# Patient Record
Sex: FEMALE | Race: WHITE
[De-identification: names, ages, dates, MRNs, and addresses within clinical notes are randomized per-mention and may not be internally consistent; named-entity substitution may affect disease eponyms.]

---

## 2018-04-22 ENCOUNTER — HOSPITAL ENCOUNTER (INPATIENT)
Dept: HOSPITAL 84 - D.ER | Age: 83
LOS: 8 days | Discharge: NURSING, REHAB OR LONG-TERM CARE | DRG: 291 | End: 2018-04-30
Attending: FAMILY MEDICINE | Admitting: FAMILY MEDICINE
Payer: MEDICARE

## 2018-04-22 VITALS
HEIGHT: 65 IN | BODY MASS INDEX: 48.82 KG/M2 | WEIGHT: 293 LBS | WEIGHT: 293 LBS | HEIGHT: 65 IN | BODY MASS INDEX: 48.82 KG/M2 | BODY MASS INDEX: 48.82 KG/M2

## 2018-04-22 DIAGNOSIS — I51.7: ICD-10-CM

## 2018-04-22 DIAGNOSIS — N18.3: ICD-10-CM

## 2018-04-22 DIAGNOSIS — E66.9: ICD-10-CM

## 2018-04-22 DIAGNOSIS — I50.31: ICD-10-CM

## 2018-04-22 DIAGNOSIS — I50.9: ICD-10-CM

## 2018-04-22 DIAGNOSIS — I13.0: Primary | ICD-10-CM

## 2018-04-22 DIAGNOSIS — E78.5: ICD-10-CM

## 2018-04-22 DIAGNOSIS — N17.9: ICD-10-CM

## 2018-04-22 DIAGNOSIS — Z91.19: ICD-10-CM

## 2018-04-22 LAB
ALBUMIN SERPL-MCNC: 3.3 G/DL (ref 3.4–5)
ALP SERPL-CCNC: 98 U/L (ref 46–116)
ALT SERPL-CCNC: 18 U/L (ref 10–68)
ANION GAP SERPL CALC-SCNC: 12.5 MMOL/L (ref 8–16)
APPEARANCE UR: (no result)
APTT BLD: 34.2 SECONDS (ref 22.8–39.4)
BACTERIA #/AREA URNS HPF: (no result) /HPF
BASOPHILS NFR BLD AUTO: 0.4 % (ref 0–2)
BILIRUB SERPL-MCNC: 0.42 MG/DL (ref 0.2–1.3)
BILIRUB SERPL-MCNC: NEGATIVE MG/DL
BUN SERPL-MCNC: 32 MG/DL (ref 7–18)
CALCIUM SERPL-MCNC: 9 MG/DL (ref 8.5–10.1)
CHLORIDE SERPL-SCNC: 105 MMOL/L (ref 98–107)
CK SERPL-CCNC: 22 UL (ref 21–215)
CO2 SERPL-SCNC: 26.5 MMOL/L (ref 21–32)
COLOR UR: YELLOW
CREAT SERPL-MCNC: 2 MG/DL (ref 0.6–1.3)
D DIMER PPP FEU-MCNC: < 0.27 UG/MLFEU (ref 0.2–0.54)
EOSINOPHIL NFR BLD: 1.5 % (ref 0–7)
ERYTHROCYTE [DISTWIDTH] IN BLOOD BY AUTOMATED COUNT: 18.8 % (ref 11.5–14.5)
GLOBULIN SER-MCNC: 2.9 G/L
GLUCOSE SERPL-MCNC: 113 MG/DL (ref 74–106)
GLUCOSE SERPL-MCNC: NEGATIVE MG/DL
HCT VFR BLD CALC: 32.4 % (ref 36–48)
HGB BLD-MCNC: 10 G/DL (ref 12–16)
IMM GRANULOCYTES NFR BLD: 0.3 % (ref 0–5)
INR PPP: 2.7 (ref 0.85–1.17)
KETONES UR STRIP-MCNC: NEGATIVE MG/DL
LYMPHOCYTES NFR BLD AUTO: 26.1 % (ref 15–50)
MAGNESIUM SERPL-MCNC: 2.1 MG/DL (ref 1.8–2.4)
MCH RBC QN AUTO: 21.1 PG (ref 26–34)
MCHC RBC AUTO-ENTMCNC: 30.9 G/DL (ref 31–37)
MCV RBC: 68.2 FL (ref 80–100)
MONOCYTES NFR BLD: 7.4 % (ref 2–11)
NEUTROPHILS NFR BLD AUTO: 64.3 % (ref 40–80)
NITRITE UR-MCNC: NEGATIVE MG/ML
NT-PROBNP SERPL-MCNC: 2878 PG/ML (ref 0–450)
OSMOLALITY SERPL CALC.SUM OF ELEC: 285 MOSM/KG (ref 275–300)
PH UR STRIP: 5.5 [PH] (ref 5–6)
PLATELET # BLD: 235 10X3/UL (ref 130–400)
POTASSIUM SERPL-SCNC: 5 MMOL/L (ref 3.5–5.1)
PROT SERPL-MCNC: 6.2 G/DL (ref 6.4–8.2)
PROT UR-MCNC: NEGATIVE MG/DL
PROTHROMBIN TIME: 27.9 SECONDS (ref 11.6–15)
RBC # BLD AUTO: 4.75 10X6/UL (ref 4–5.4)
RBC #/AREA URNS HPF: (no result) /HPF (ref 0–5)
SODIUM SERPL-SCNC: 139 MMOL/L (ref 136–145)
SP GR UR STRIP: 1.01 (ref 1–1.02)
SQUAMOUS #/AREA URNS HPF: (no result) /HPF (ref 0–5)
TROPONIN I SERPL-MCNC: < 0.017 NG/ML (ref 0–0.06)
TSH SERPL-ACNC: 1.24 UIU/ML (ref 0.36–3.74)
UROBILINOGEN UR-MCNC: NORMAL MG/DL
WBC # BLD AUTO: 7.9 10X3/UL (ref 4.8–10.8)
WBC #/AREA URNS HPF: (no result) /HPF (ref 0–5)

## 2018-04-23 VITALS — DIASTOLIC BLOOD PRESSURE: 65 MMHG | SYSTOLIC BLOOD PRESSURE: 95 MMHG

## 2018-04-23 VITALS — SYSTOLIC BLOOD PRESSURE: 129 MMHG | DIASTOLIC BLOOD PRESSURE: 72 MMHG

## 2018-04-23 VITALS — DIASTOLIC BLOOD PRESSURE: 64 MMHG | SYSTOLIC BLOOD PRESSURE: 145 MMHG

## 2018-04-23 VITALS — DIASTOLIC BLOOD PRESSURE: 86 MMHG | SYSTOLIC BLOOD PRESSURE: 155 MMHG

## 2018-04-23 VITALS — SYSTOLIC BLOOD PRESSURE: 141 MMHG | DIASTOLIC BLOOD PRESSURE: 64 MMHG

## 2018-04-23 LAB
ANION GAP SERPL CALC-SCNC: 11.6 MMOL/L (ref 8–16)
BASOPHILS NFR BLD AUTO: 0.5 % (ref 0–2)
BUN SERPL-MCNC: 31 MG/DL (ref 7–18)
CALCIUM SERPL-MCNC: 8.5 MG/DL (ref 8.5–10.1)
CHLORIDE SERPL-SCNC: 104 MMOL/L (ref 98–107)
CO2 SERPL-SCNC: 27.2 MMOL/L (ref 21–32)
CREAT SERPL-MCNC: 2.1 MG/DL (ref 0.6–1.3)
EOSINOPHIL NFR BLD: 1.6 % (ref 0–7)
ERYTHROCYTE [DISTWIDTH] IN BLOOD BY AUTOMATED COUNT: 18.5 % (ref 11.5–14.5)
GLUCOSE SERPL-MCNC: 123 MG/DL (ref 74–106)
HCT VFR BLD CALC: 30.8 % (ref 36–48)
HGB BLD-MCNC: 9.4 G/DL (ref 12–16)
IMM GRANULOCYTES NFR BLD: 0.3 % (ref 0–5)
LYMPHOCYTES NFR BLD AUTO: 25.5 % (ref 15–50)
MCH RBC QN AUTO: 20.7 PG (ref 26–34)
MCHC RBC AUTO-ENTMCNC: 30.5 G/DL (ref 31–37)
MCV RBC: 67.8 FL (ref 80–100)
MONOCYTES NFR BLD: 10.2 % (ref 2–11)
NEUTROPHILS NFR BLD AUTO: 61.9 % (ref 40–80)
OSMOLALITY SERPL CALC.SUM OF ELEC: 283 MOSM/KG (ref 275–300)
PLATELET # BLD: 229 10X3/UL (ref 130–400)
POTASSIUM SERPL-SCNC: 4.8 MMOL/L (ref 3.5–5.1)
RBC # BLD AUTO: 4.54 10X6/UL (ref 4–5.4)
SODIUM SERPL-SCNC: 138 MMOL/L (ref 136–145)
WBC # BLD AUTO: 7.6 10X3/UL (ref 4.8–10.8)

## 2018-04-24 VITALS — SYSTOLIC BLOOD PRESSURE: 109 MMHG | DIASTOLIC BLOOD PRESSURE: 59 MMHG

## 2018-04-24 VITALS — SYSTOLIC BLOOD PRESSURE: 150 MMHG | DIASTOLIC BLOOD PRESSURE: 95 MMHG

## 2018-04-24 VITALS — SYSTOLIC BLOOD PRESSURE: 116 MMHG | DIASTOLIC BLOOD PRESSURE: 55 MMHG

## 2018-04-24 VITALS — SYSTOLIC BLOOD PRESSURE: 103 MMHG | DIASTOLIC BLOOD PRESSURE: 59 MMHG

## 2018-04-24 VITALS — DIASTOLIC BLOOD PRESSURE: 55 MMHG | SYSTOLIC BLOOD PRESSURE: 105 MMHG

## 2018-04-24 VITALS — DIASTOLIC BLOOD PRESSURE: 57 MMHG | SYSTOLIC BLOOD PRESSURE: 103 MMHG

## 2018-04-24 LAB
ALBUMIN SERPL-MCNC: 3 G/DL (ref 3.4–5)
ALP SERPL-CCNC: 92 U/L (ref 46–116)
ALT SERPL-CCNC: 16 U/L (ref 10–68)
ANION GAP SERPL CALC-SCNC: 11.1 MMOL/L (ref 8–16)
BASOPHILS NFR BLD AUTO: 0.7 % (ref 0–2)
BILIRUB SERPL-MCNC: 0.4 MG/DL (ref 0.2–1.3)
BUN SERPL-MCNC: 38 MG/DL (ref 7–18)
CALCIUM SERPL-MCNC: 9.1 MG/DL (ref 8.5–10.1)
CHLORIDE SERPL-SCNC: 104 MMOL/L (ref 98–107)
CO2 SERPL-SCNC: 29.5 MMOL/L (ref 21–32)
CREAT SERPL-MCNC: 2.5 MG/DL (ref 0.6–1.3)
CREATININE - URINE: 41.7 MG/DL (ref 30–125)
EOSINOPHIL NFR BLD: 2.4 % (ref 0–7)
ERYTHROCYTE [DISTWIDTH] IN BLOOD BY AUTOMATED COUNT: 18.5 % (ref 11.5–14.5)
ERYTHROCYTE [SEDIMENTATION RATE] IN BLOOD: 19 MM/HR (ref 0–42)
GLOBULIN SER-MCNC: 3.2 G/L
GLUCOSE SERPL-MCNC: 118 MG/DL (ref 74–106)
HCT VFR BLD CALC: 32.2 % (ref 36–48)
HGB BLD-MCNC: 9.6 G/DL (ref 12–16)
IMM GRANULOCYTES NFR BLD: 0.3 % (ref 0–5)
LYMPHOCYTES NFR BLD AUTO: 21.2 % (ref 15–50)
MCH RBC QN AUTO: 20.3 PG (ref 26–34)
MCHC RBC AUTO-ENTMCNC: 29.8 G/DL (ref 31–37)
MCV RBC: 68.2 FL (ref 80–100)
MONOCYTES NFR BLD: 8.8 % (ref 2–11)
NEUTROPHILS NFR BLD AUTO: 66.6 % (ref 40–80)
OSMOLALITY SERPL CALC.SUM OF ELEC: 288 MOSM/KG (ref 275–300)
PLATELET # BLD: 259 10X3/UL (ref 130–400)
POTASSIUM SERPL-SCNC: 4.6 MMOL/L (ref 3.5–5.1)
PROT SERPL-MCNC: 6.2 G/DL (ref 6.4–8.2)
PROT UR-MCNC: 4.7 MG/DL (ref 0–11.9)
RBC # BLD AUTO: 4.72 10X6/UL (ref 4–5.4)
SODIUM SERPL-SCNC: 140 MMOL/L (ref 136–145)
WBC # BLD AUTO: 7.2 10X3/UL (ref 4.8–10.8)

## 2018-04-25 VITALS — DIASTOLIC BLOOD PRESSURE: 71 MMHG | SYSTOLIC BLOOD PRESSURE: 121 MMHG

## 2018-04-25 VITALS — SYSTOLIC BLOOD PRESSURE: 117 MMHG | DIASTOLIC BLOOD PRESSURE: 71 MMHG

## 2018-04-25 VITALS — SYSTOLIC BLOOD PRESSURE: 113 MMHG | DIASTOLIC BLOOD PRESSURE: 67 MMHG

## 2018-04-25 VITALS — SYSTOLIC BLOOD PRESSURE: 123 MMHG | DIASTOLIC BLOOD PRESSURE: 77 MMHG

## 2018-04-25 VITALS — SYSTOLIC BLOOD PRESSURE: 121 MMHG | DIASTOLIC BLOOD PRESSURE: 53 MMHG

## 2018-04-25 LAB
ALBUMIN SERPL ELPH-MCNC: 3.1 G/DL (ref 2.9–4.4)
ALBUMIN SERPL-MCNC: 2.9 G/DL (ref 3.4–5)
ALBUMIN/GLOB SERPL: 1.2 {RATIO} (ref 0.7–1.7)
ALP SERPL-CCNC: 101 U/L (ref 46–116)
ALPHA1 GLOB SERPL ELPH-MCNC: 0.2 G/DL (ref 0–0.4)
ALPHA2 GLOB SERPL ELPH-MCNC: 0.8 G/DL (ref 0.4–1)
ALT SERPL-CCNC: 14 U/L (ref 10–68)
ANION GAP SERPL CALC-SCNC: 11.7 MMOL/L (ref 8–16)
B-GLOBULIN SERPL ELPH-MCNC: 0.9 G/DL (ref 0.7–1.3)
BASOPHILS NFR BLD AUTO: 0.6 % (ref 0–2)
BILIRUB SERPL-MCNC: 0.35 MG/DL (ref 0.2–1.3)
BUN SERPL-MCNC: 46 MG/DL (ref 7–18)
CALCIUM SERPL-MCNC: 9 MG/DL (ref 8.5–10.1)
CHLORIDE SERPL-SCNC: 103 MMOL/L (ref 98–107)
CO2 SERPL-SCNC: 30.4 MMOL/L (ref 21–32)
CREAT SERPL-MCNC: 2.2 MG/DL (ref 0.6–1.3)
EOSINOPHIL NFR BLD: 2.8 % (ref 0–7)
ERYTHROCYTE [DISTWIDTH] IN BLOOD BY AUTOMATED COUNT: 18.3 % (ref 11.5–14.5)
GAMMA GLOB SERPL ELPH-MCNC: 0.6 G/DL (ref 0.4–1.8)
GLOBULIN SER-MCNC: 3.3 G/L
GLUCOSE SERPL-MCNC: 136 MG/DL (ref 74–106)
HCT VFR BLD CALC: 32.8 % (ref 36–48)
HGB BLD-MCNC: 10 G/DL (ref 12–16)
IMM GRANULOCYTES NFR BLD: 0.3 % (ref 0–5)
INR PPP: 2.55 (ref 0.85–1.17)
LYMPHOCYTES NFR BLD AUTO: 20.1 % (ref 15–50)
MCH RBC QN AUTO: 21 PG (ref 26–34)
MCHC RBC AUTO-ENTMCNC: 30.5 G/DL (ref 31–37)
MCV RBC: 68.9 FL (ref 80–100)
MONOCYTES NFR BLD: 11.4 % (ref 2–11)
NEUTROPHILS NFR BLD AUTO: 64.8 % (ref 40–80)
OSMOLALITY SERPL CALC.SUM OF ELEC: 292 MOSM/KG (ref 275–300)
PLATELET # BLD: 213 10X3/UL (ref 130–400)
POTASSIUM SERPL-SCNC: 5.1 MMOL/L (ref 3.5–5.1)
PROT SERPL-MCNC: 5.6 G/DL (ref 6–8.5)
PROT SERPL-MCNC: 6.2 G/DL (ref 6.4–8.2)
PROTHROMBIN TIME: 26.7 SECONDS (ref 11.6–15)
RBC # BLD AUTO: 4.76 10X6/UL (ref 4–5.4)
SODIUM SERPL-SCNC: 140 MMOL/L (ref 136–145)
WBC # BLD AUTO: 7.1 10X3/UL (ref 4.8–10.8)

## 2018-04-26 VITALS — SYSTOLIC BLOOD PRESSURE: 109 MMHG | DIASTOLIC BLOOD PRESSURE: 60 MMHG

## 2018-04-26 VITALS — SYSTOLIC BLOOD PRESSURE: 114 MMHG | DIASTOLIC BLOOD PRESSURE: 60 MMHG

## 2018-04-26 VITALS — SYSTOLIC BLOOD PRESSURE: 144 MMHG | DIASTOLIC BLOOD PRESSURE: 74 MMHG

## 2018-04-26 VITALS — DIASTOLIC BLOOD PRESSURE: 47 MMHG | SYSTOLIC BLOOD PRESSURE: 126 MMHG

## 2018-04-26 VITALS — SYSTOLIC BLOOD PRESSURE: 119 MMHG | DIASTOLIC BLOOD PRESSURE: 56 MMHG

## 2018-04-26 VITALS — DIASTOLIC BLOOD PRESSURE: 64 MMHG | SYSTOLIC BLOOD PRESSURE: 133 MMHG

## 2018-04-26 LAB
INR PPP: 1.87 (ref 0.85–1.17)
PROTHROMBIN TIME: 21 SECONDS (ref 11.6–15)

## 2018-04-27 VITALS — SYSTOLIC BLOOD PRESSURE: 121 MMHG | DIASTOLIC BLOOD PRESSURE: 72 MMHG

## 2018-04-27 VITALS — DIASTOLIC BLOOD PRESSURE: 70 MMHG | SYSTOLIC BLOOD PRESSURE: 127 MMHG

## 2018-04-27 VITALS — DIASTOLIC BLOOD PRESSURE: 61 MMHG | SYSTOLIC BLOOD PRESSURE: 114 MMHG

## 2018-04-27 VITALS — SYSTOLIC BLOOD PRESSURE: 141 MMHG | DIASTOLIC BLOOD PRESSURE: 78 MMHG

## 2018-04-27 VITALS — SYSTOLIC BLOOD PRESSURE: 123 MMHG | DIASTOLIC BLOOD PRESSURE: 72 MMHG

## 2018-04-27 LAB
INR PPP: 1.95 (ref 0.85–1.17)
PROTHROMBIN TIME: 21.6 SECONDS (ref 11.6–15)

## 2018-04-28 VITALS — DIASTOLIC BLOOD PRESSURE: 71 MMHG | SYSTOLIC BLOOD PRESSURE: 122 MMHG

## 2018-04-28 VITALS — DIASTOLIC BLOOD PRESSURE: 77 MMHG | SYSTOLIC BLOOD PRESSURE: 111 MMHG

## 2018-04-28 VITALS — DIASTOLIC BLOOD PRESSURE: 71 MMHG | SYSTOLIC BLOOD PRESSURE: 133 MMHG

## 2018-04-28 VITALS — SYSTOLIC BLOOD PRESSURE: 125 MMHG | DIASTOLIC BLOOD PRESSURE: 65 MMHG

## 2018-04-28 VITALS — DIASTOLIC BLOOD PRESSURE: 71 MMHG | SYSTOLIC BLOOD PRESSURE: 129 MMHG

## 2018-04-28 VITALS — DIASTOLIC BLOOD PRESSURE: 79 MMHG | SYSTOLIC BLOOD PRESSURE: 124 MMHG

## 2018-04-28 LAB
ALBUMIN SERPL-MCNC: 2.7 G/DL (ref 3.4–5)
ALP SERPL-CCNC: 100 U/L (ref 46–116)
ALT SERPL-CCNC: 17 U/L (ref 10–68)
ANION GAP SERPL CALC-SCNC: 10 MMOL/L (ref 8–16)
BASOPHILS NFR BLD AUTO: 0.4 % (ref 0–2)
BILIRUB SERPL-MCNC: 0.4 MG/DL (ref 0.2–1.3)
BUN SERPL-MCNC: 50 MG/DL (ref 7–18)
CALCIUM SERPL-MCNC: 9.7 MG/DL (ref 8.5–10.1)
CHLORIDE SERPL-SCNC: 99 MMOL/L (ref 98–107)
CO2 SERPL-SCNC: 31.6 MMOL/L (ref 21–32)
CREAT SERPL-MCNC: 2 MG/DL (ref 0.6–1.3)
EOSINOPHIL NFR BLD: 3.6 % (ref 0–7)
ERYTHROCYTE [DISTWIDTH] IN BLOOD BY AUTOMATED COUNT: 17.5 % (ref 11.5–14.5)
GLOBULIN SER-MCNC: 3.4 G/L
GLUCOSE SERPL-MCNC: 116 MG/DL (ref 74–106)
HCT VFR BLD CALC: 30.5 % (ref 36–48)
HGB BLD-MCNC: 9.3 G/DL (ref 12–16)
IMM GRANULOCYTES NFR BLD: 0.3 % (ref 0–5)
LYMPHOCYTES NFR BLD AUTO: 24.1 % (ref 15–50)
MCH RBC QN AUTO: 20.5 PG (ref 26–34)
MCHC RBC AUTO-ENTMCNC: 30.5 G/DL (ref 31–37)
MCV RBC: 67.3 FL (ref 80–100)
MONOCYTES NFR BLD: 10 % (ref 2–11)
NEUTROPHILS NFR BLD AUTO: 61.6 % (ref 40–80)
OSMOLALITY SERPL CALC.SUM OF ELEC: 285 MOSM/KG (ref 275–300)
PLATELET # BLD: 233 10X3/UL (ref 130–400)
POTASSIUM SERPL-SCNC: 4.6 MMOL/L (ref 3.5–5.1)
PROT SERPL-MCNC: 6.1 G/DL (ref 6.4–8.2)
RBC # BLD AUTO: 4.53 10X6/UL (ref 4–5.4)
SODIUM SERPL-SCNC: 136 MMOL/L (ref 136–145)
WBC # BLD AUTO: 6.7 10X3/UL (ref 4.8–10.8)

## 2018-04-29 VITALS — DIASTOLIC BLOOD PRESSURE: 71 MMHG | SYSTOLIC BLOOD PRESSURE: 106 MMHG

## 2018-04-29 VITALS — DIASTOLIC BLOOD PRESSURE: 53 MMHG | SYSTOLIC BLOOD PRESSURE: 107 MMHG

## 2018-04-29 VITALS — SYSTOLIC BLOOD PRESSURE: 121 MMHG | DIASTOLIC BLOOD PRESSURE: 71 MMHG

## 2018-04-29 VITALS — DIASTOLIC BLOOD PRESSURE: 82 MMHG | SYSTOLIC BLOOD PRESSURE: 132 MMHG

## 2018-04-29 VITALS — SYSTOLIC BLOOD PRESSURE: 119 MMHG | DIASTOLIC BLOOD PRESSURE: 75 MMHG

## 2018-04-29 VITALS — SYSTOLIC BLOOD PRESSURE: 134 MMHG | DIASTOLIC BLOOD PRESSURE: 74 MMHG

## 2018-04-30 VITALS — DIASTOLIC BLOOD PRESSURE: 71 MMHG | SYSTOLIC BLOOD PRESSURE: 126 MMHG

## 2018-04-30 VITALS — DIASTOLIC BLOOD PRESSURE: 64 MMHG | SYSTOLIC BLOOD PRESSURE: 119 MMHG

## 2019-01-31 ENCOUNTER — HOSPITAL ENCOUNTER (OUTPATIENT)
Dept: HOSPITAL 84 - D.LABREF | Age: 84
Discharge: HOME | End: 2019-01-31
Attending: FAMILY MEDICINE
Payer: MEDICARE

## 2019-01-31 VITALS — BODY MASS INDEX: 49.2 KG/M2

## 2019-01-31 DIAGNOSIS — Z79.01: ICD-10-CM

## 2019-01-31 DIAGNOSIS — I50.22: ICD-10-CM

## 2019-01-31 DIAGNOSIS — Z51.81: Primary | ICD-10-CM

## 2019-01-31 DIAGNOSIS — I25.10: ICD-10-CM

## 2019-01-31 LAB
INR PPP: 1.83 (ref 0.85–1.17)
PROTHROMBIN TIME: 20.5 SECONDS (ref 11.6–15)

## 2019-02-06 ENCOUNTER — HOSPITAL ENCOUNTER (OUTPATIENT)
Dept: HOSPITAL 84 - D.LABREF | Age: 84
Discharge: HOME | End: 2019-02-06
Attending: FAMILY MEDICINE
Payer: MEDICARE

## 2019-02-06 VITALS — BODY MASS INDEX: 49.2 KG/M2

## 2019-02-06 DIAGNOSIS — I25.10: ICD-10-CM

## 2019-02-06 DIAGNOSIS — I50.9: ICD-10-CM

## 2019-02-06 DIAGNOSIS — Z51.81: Primary | ICD-10-CM

## 2019-02-06 DIAGNOSIS — Z79.01: ICD-10-CM

## 2019-02-06 LAB
INR PPP: 1.92 (ref 0.85–1.17)
PROTHROMBIN TIME: 21.3 SECONDS (ref 11.6–15)

## 2019-08-12 ENCOUNTER — HOSPITAL ENCOUNTER (INPATIENT)
Dept: HOSPITAL 84 - D.ER | Age: 84
LOS: 4 days | Discharge: NURSING, REHAB OR LONG-TERM CARE | DRG: 640 | End: 2019-08-16
Attending: INTERNAL MEDICINE | Admitting: INTERNAL MEDICINE
Payer: MEDICARE

## 2019-08-12 VITALS
HEIGHT: 65 IN | BODY MASS INDEX: 48.82 KG/M2 | WEIGHT: 293 LBS | BODY MASS INDEX: 48.82 KG/M2 | HEIGHT: 65 IN | BODY MASS INDEX: 48.82 KG/M2 | WEIGHT: 293 LBS

## 2019-08-12 VITALS — DIASTOLIC BLOOD PRESSURE: 65 MMHG | SYSTOLIC BLOOD PRESSURE: 147 MMHG

## 2019-08-12 VITALS — DIASTOLIC BLOOD PRESSURE: 73 MMHG | SYSTOLIC BLOOD PRESSURE: 141 MMHG

## 2019-08-12 VITALS — DIASTOLIC BLOOD PRESSURE: 87 MMHG | SYSTOLIC BLOOD PRESSURE: 145 MMHG

## 2019-08-12 DIAGNOSIS — E87.1: Primary | ICD-10-CM

## 2019-08-12 DIAGNOSIS — N39.0: ICD-10-CM

## 2019-08-12 DIAGNOSIS — I50.33: ICD-10-CM

## 2019-08-12 DIAGNOSIS — E03.9: ICD-10-CM

## 2019-08-12 DIAGNOSIS — D50.9: ICD-10-CM

## 2019-08-12 DIAGNOSIS — I11.0: ICD-10-CM

## 2019-08-12 DIAGNOSIS — B96.20: ICD-10-CM

## 2019-08-12 DIAGNOSIS — F32.9: ICD-10-CM

## 2019-08-12 DIAGNOSIS — E83.42: ICD-10-CM

## 2019-08-12 DIAGNOSIS — I08.1: ICD-10-CM

## 2019-08-12 DIAGNOSIS — F41.9: ICD-10-CM

## 2019-08-12 DIAGNOSIS — J44.9: ICD-10-CM

## 2019-08-12 DIAGNOSIS — J96.11: ICD-10-CM

## 2019-08-12 DIAGNOSIS — I48.91: ICD-10-CM

## 2019-08-12 DIAGNOSIS — N17.9: ICD-10-CM

## 2019-08-12 DIAGNOSIS — E66.01: ICD-10-CM

## 2019-08-12 DIAGNOSIS — K21.9: ICD-10-CM

## 2019-08-12 DIAGNOSIS — E78.5: ICD-10-CM

## 2019-08-12 LAB
ALBUMIN SERPL-MCNC: 3.1 G/DL (ref 3.4–5)
ALP SERPL-CCNC: 120 U/L (ref 46–116)
ALT SERPL-CCNC: 13 U/L (ref 10–68)
AMPHETAMINES UR QL SCN: NEGATIVE QUAL
ANION GAP SERPL CALC-SCNC: 7.2 MMOL/L (ref 8–16)
APPEARANCE UR: (no result)
APTT BLD: 39.2 SECONDS (ref 22.8–39.4)
BACTERIA #/AREA URNS HPF: (no result) /HPF
BARBITURATES UR QL SCN: NEGATIVE QUAL
BASOPHILS NFR BLD AUTO: 0.3 % (ref 0–2)
BENZODIAZ UR QL SCN: NEGATIVE QUAL
BILIRUB SERPL-MCNC: 0.73 MG/DL (ref 0.2–1.3)
BILIRUB SERPL-MCNC: NEGATIVE MG/DL
BUN SERPL-MCNC: 16 MG/DL (ref 7–18)
BZE UR QL SCN: NEGATIVE QUAL
CALCIUM SERPL-MCNC: 9.2 MG/DL (ref 8.5–10.1)
CANNABINOIDS UR QL SCN: NEGATIVE QUAL
CHLORIDE SERPL-SCNC: 98 MMOL/L (ref 98–107)
CK MB SERPL-MCNC: 0.5 U/L (ref 0–3.6)
CK SERPL-CCNC: 25 UL (ref 21–215)
CO2 SERPL-SCNC: 28.5 MMOL/L (ref 21–32)
COLOR UR: YELLOW
CREAT SERPL-MCNC: 1.5 MG/DL (ref 0.6–1.3)
EOSINOPHIL NFR BLD: 0.4 % (ref 0–7)
ERYTHROCYTE [DISTWIDTH] IN BLOOD BY AUTOMATED COUNT: 19.1 % (ref 11.5–14.5)
GLOBULIN SER-MCNC: 2.7 G/L
GLUCOSE SERPL-MCNC: 106 MG/DL (ref 74–106)
GLUCOSE SERPL-MCNC: NEGATIVE MG/DL
HCT VFR BLD CALC: 34.1 % (ref 36–48)
HGB BLD-MCNC: 11.1 G/DL (ref 12–16)
IMM GRANULOCYTES NFR BLD: 0.4 % (ref 0–5)
INR PPP: 2.83 (ref 0.85–1.17)
KETONES UR STRIP-MCNC: NEGATIVE MG/DL
LYMPHOCYTES NFR BLD AUTO: 8.4 % (ref 15–50)
MAGNESIUM SERPL-MCNC: 1.8 MG/DL (ref 1.8–2.4)
MCH RBC QN AUTO: 21.3 PG (ref 26–34)
MCHC RBC AUTO-ENTMCNC: 32.6 G/DL (ref 31–37)
MCV RBC: 65.6 FL (ref 80–100)
MONOCYTES NFR BLD: 7.2 % (ref 2–11)
NEUTROPHILS NFR BLD AUTO: 83.3 % (ref 40–80)
NITRITE UR-MCNC: NEGATIVE MG/ML
OPIATES UR QL SCN: NEGATIVE QUAL
OSMOLALITY SERPL CALC.SUM OF ELEC: 261 MOSM/KG (ref 275–300)
PCP UR QL SCN: NEGATIVE QUAL
PH UR STRIP: 6 [PH] (ref 5–6)
PLATELET # BLD: 206 10X3/UL (ref 130–400)
POTASSIUM SERPL-SCNC: 3.7 MMOL/L (ref 3.5–5.1)
PROT SERPL-MCNC: 5.8 G/DL (ref 6.4–8.2)
PROT UR-MCNC: NEGATIVE MG/DL
PROTHROMBIN TIME: 29 SECONDS (ref 11.6–15)
RBC # BLD AUTO: 5.2 10X6/UL (ref 4–5.4)
RBC #/AREA URNS HPF: (no result) /HPF (ref 0–5)
SODIUM SERPL-SCNC: 130 MMOL/L (ref 136–145)
SP GR UR STRIP: 1.01 (ref 1–1.02)
SQUAMOUS #/AREA URNS HPF: (no result) /HPF (ref 0–5)
TROPONIN I SERPL-MCNC: < 0.017 NG/ML (ref 0–0.06)
TSH SERPL-ACNC: 1.06 UIU/ML (ref 0.36–3.74)
UROBILINOGEN UR-MCNC: NORMAL MG/DL
WBC # BLD AUTO: 9.4 10X3/UL (ref 4.8–10.8)
WBC #/AREA URNS HPF: (no result) /HPF (ref 0–5)

## 2019-08-12 NOTE — NUR
PT STATES SHE IS URINATING EACH TIME SHE IS COUGHING AND PT HAS DRY WEAK COUGH
FREQUENTLY. TOTAL LINEN CHANGE PERFORMED BY ED NURSES, PT'S MOBILITY IS VERY
LIMITED. URINE SATURATED BRIEF DISCHARDED.

## 2019-08-12 NOTE — NUR
PT LYING IN BED, RESPIRATIONS EVEN AND UNLABORED. MULTIPLE BLANKETS AND ICE
WATER PROVIDED FOR COMFORT AS REQUESTED. FAMILY AT THE BEDSIDE. CALL LIGHT IN
REACH. WILL CONTINUE TO MONITOR.

## 2019-08-12 NOTE — NUR
PT ARRIVED TO FLOOR FROM ER. VSS AND WNL. PT 02 % ON 3L. PT USUALLY
WEARS 2L AT HOME. DECREASED O2 TO 2L. FULL LINEN CHANGE PROVIDED DUE TO URINE
INCONTINENCE

## 2019-08-13 VITALS — DIASTOLIC BLOOD PRESSURE: 56 MMHG | SYSTOLIC BLOOD PRESSURE: 146 MMHG

## 2019-08-13 VITALS — DIASTOLIC BLOOD PRESSURE: 75 MMHG | SYSTOLIC BLOOD PRESSURE: 145 MMHG

## 2019-08-13 VITALS — DIASTOLIC BLOOD PRESSURE: 65 MMHG | SYSTOLIC BLOOD PRESSURE: 147 MMHG

## 2019-08-13 VITALS — SYSTOLIC BLOOD PRESSURE: 156 MMHG | DIASTOLIC BLOOD PRESSURE: 60 MMHG

## 2019-08-13 VITALS — SYSTOLIC BLOOD PRESSURE: 152 MMHG | DIASTOLIC BLOOD PRESSURE: 70 MMHG

## 2019-08-13 VITALS — DIASTOLIC BLOOD PRESSURE: 84 MMHG | SYSTOLIC BLOOD PRESSURE: 125 MMHG

## 2019-08-13 LAB
ANION GAP SERPL CALC-SCNC: 10.3 MMOL/L (ref 8–16)
APTT BLD: 53.2 SECONDS (ref 22.8–39.4)
BASOPHILS NFR BLD AUTO: 0.4 % (ref 0–2)
BUN SERPL-MCNC: 15 MG/DL (ref 7–18)
CALCIUM SERPL-MCNC: 9 MG/DL (ref 8.5–10.1)
CHLORIDE SERPL-SCNC: 103 MMOL/L (ref 98–107)
CK MB SERPL-MCNC: 0.6 U/L (ref 0–3.6)
CK MB SERPL-MCNC: 0.6 U/L (ref 0–3.6)
CK SERPL-CCNC: 21 UL (ref 21–215)
CK SERPL-CCNC: 22 UL (ref 21–215)
CO2 SERPL-SCNC: 31.3 MMOL/L (ref 21–32)
CREAT SERPL-MCNC: 1.6 MG/DL (ref 0.6–1.3)
EOSINOPHIL NFR BLD: 1.3 % (ref 0–7)
ERYTHROCYTE [DISTWIDTH] IN BLOOD BY AUTOMATED COUNT: 19.2 % (ref 11.5–14.5)
FERRITIN SERPL-MCNC: 98 NG/ML (ref 3–244)
GLUCOSE SERPL-MCNC: 82 MG/DL (ref 74–106)
HCT VFR BLD CALC: 32.5 % (ref 36–48)
HGB BLD-MCNC: 10.4 G/DL (ref 12–16)
IMM GRANULOCYTES NFR BLD: 0.3 % (ref 0–5)
INR PPP: 2.78 (ref 0.85–1.17)
IRON SERPL-MCNC: 114 UG/DL (ref 35–150)
LYMPHOCYTES NFR BLD AUTO: 14.6 % (ref 15–50)
MAGNESIUM SERPL-MCNC: 1.6 MG/DL (ref 1.8–2.4)
MCH RBC QN AUTO: 21.3 PG (ref 26–34)
MCHC RBC AUTO-ENTMCNC: 32 G/DL (ref 31–37)
MCV RBC: 66.5 FL (ref 80–100)
MONOCYTES NFR BLD: 8.3 % (ref 2–11)
NEUTROPHILS NFR BLD AUTO: 75.1 % (ref 40–80)
NT-PROBNP SERPL-MCNC: 3669 PG/ML (ref 0–450)
OSMOLALITY SERPL CALC.SUM OF ELEC: 280 MOSM/KG (ref 275–300)
PHOSPHATE SERPL-MCNC: 3 MG/DL (ref 2.5–4.9)
PLATELET # BLD: 193 10X3/UL (ref 130–400)
POTASSIUM SERPL-SCNC: 3.6 MMOL/L (ref 3.5–5.1)
PROTHROMBIN TIME: 28.6 SECONDS (ref 11.6–15)
RBC # BLD AUTO: 4.89 10X6/UL (ref 4–5.4)
SAO2 % BLD FROM PO2: 44 % (ref 15–55)
SODIUM SERPL-SCNC: 141 MMOL/L (ref 136–145)
TIBC SERPL-MCNC: 257 UG/DL (ref 260–445)
TROPONIN I SERPL-MCNC: < 0.017 NG/ML (ref 0–0.06)
TROPONIN I SERPL-MCNC: < 0.017 NG/ML (ref 0–0.06)
UIBC SERPL-MCNC: 143 UG/DL (ref 150–375)
WBC # BLD AUTO: 7.5 10X3/UL (ref 4.8–10.8)

## 2019-08-13 NOTE — NUR
ASSISTED PT ONTO AND OFF OF BED AMBROCIO. CHANGED LINENS. CALLED [facility] TO OBTAIN
ORDER FOR NYSTATIN POWDER. WILL CTM.

## 2019-08-13 NOTE — NUR
HUNG IV MEDICATION, NO ISSUES. PT RESTING WITH EYES CLOSED, BREATHING EVEN AND
UNLABORED. NO S/S OF DISTRESS. WILL CTM.

## 2019-08-13 NOTE — MORECARE
CASE MANAGEMENT DISCHARGE SUMMARY
 
 
PATIENT: HYUN RODAS                      UNIT: [id number]
ACCOUNT#: [id number]                       ADM DATE: 19
AGE: 86     : 32  SEX: F            ROOM/BED: D.1204    
AUTHOR: JAKE MCKEON                             PHYSICIAN:                               
 
REFERRING PHYSICIAN: RK NICHOLSON MD               
DATE OF SERVICE: 19
Discharge Plan
 
 
Patient Name: HYUN RODAS
Facility: [facility]:[place]
Encounter #: [id number]
Medical Record #: [id number]
: 1932
Planned Disposition: Home
Anticipated Discharge Date: 
 
Discharge Date: 
Expected LOS: 
Initial Reviewer: [email or web address]
Initial Review Date: 2019
Generated: 19   8:07 pm 
 DCPIA - Discharge Planning Initial Assessment
 
Updated by [email or web address]: Elvira Lacy on 19   7:05 pm
*  Is the patient Alert and Oriented?
Yes
*  How many steps to enter\exit or inside your home?
 
*  PCP
[facility]
*  Pharmacy
[facility]
*  Preadmission Environment
Home Alone
*  ADLs
Partial Dependent
*  Other Equipment
walker, w/c and home 02
*  List name and contact numbers for known caregivers / representatives who 
currently or will assist patient after discharge:
Gabby Jimenes - daughter - [phone], [phone]
*  Verbal permission to speak to the caregivers and representatives has been 
obtained from the patient.
Yes
*  Community resources currently utilized
 
Other
*  Please name any agencies selected above.
[facility] - unknown provider
*  Additional services required to return to the preadmission environment?
No
*  Can the patient safely return to the preadmission environment?
Yes
*  Has this patient been hospitalized within the prior 30 days at any 
hospital?
No
 
 
 
 
 
 
Patient Name: HYUN RODAS
Encounter #: [id number]
Page [id number]
 
 
 
 
 
Electronically Signed by JAKE MCKEON on 19 at 1907
 
 
 
 
 
 
**All edits/amendments must be made on the electronic document**
 
DICTATION DATE: 19     : BART  19     
RPT#: [id number]                                DC DATE:        
                                               STATUS: ADM IN  
[facility]
 [place] [address]
***END OF REPORT***

## 2019-08-13 NOTE — MORECARE
CASE MANAGEMENT DISCHARGE SUMMARY
 
 
PATIENT: HYUN RODAS                      UNIT: [id number]
ACCOUNT#: [id number]                       ADM DATE: 19
AGE: 86     : 32  SEX: F            ROOM/BED: D.1204    
AUTHOR: LINDA,DOC                             PHYSICIAN:                               
 
REFERRING PHYSICIAN: RK NICHOLSON MD               
DATE OF SERVICE: 19
Discharge Plan
 
 
Patient Name: HYUN RODAS
Facility: [facility]:[place]
Encounter #: [id number]
Medical Record #: [id number]
: 1932
Planned Disposition: Home
Anticipated Discharge Date: 
 
Discharge Date: 
Expected LOS: 
Initial Reviewer: [id number]
Initial Review Date: 2019
Generated: 19   8:14 pm 
Comments
 
DCP- Discharge Planning
 
Updated by [id number]: Elvira Lacy on 19   6:09 pm CT
Patient Name: HYUN RODAS                                     
Admission Status: ER   
Accout number: [id number]                              
Admission Date: 2019   
: 1932                                                        
Admission Diagnosis:   
Attending: RK NICHOLSON                                                
Current LOS:  1   
  
Anticipated DC Date:    
Planned Disposition: Home   
Primary Insurance: [employer] ADV   
  
  
Discharge Planning Comments: CM met with patient at bedside after explaining 
CM role and obtaining verbal consent. Patient lives at home alone and plans 
to return there upon discharge.  Patient feels this would be a safe discharge.
 Patient states she has home 02, walker, w/c. Patient states that she has an 
aide that comes and helps with house cleaning and bathing.  CM discussed 
 
availability / needs of home health and medical equipment.  Patient denies 
any discharge needs at this time. Patient states she will have her family 
drive her home upon discharge.  CM will continue to follow and assist as 
needed with discharge planning / needs.  
  
  
  
  
  
  
: Elvira Lacy
 DCPIA - Discharge Planning Initial Assessment
 
Updated by [email or web address]: Elvira Lacy on 19   7:05 pm
*  Is the patient Alert and Oriented?
Yes
*  How many steps to enter\exit or inside your home?
 
*  PCP
[facility]
*  Pharmacy
[facility]
*  Preadmission Environment
Home Alone
*  ADLs
Partial Dependent
*  Other Equipment
walker, w/c and home 02
*  List name and contact numbers for known caregivers / representatives who 
currently or will assist patient after discharge:
Gabby Blas daughter - [phone], [phone]
*  Verbal permission to speak to the caregivers and representatives has been 
obtained from the patient.
Yes
*  Community resources currently utilized
Other
*  Please name any agencies selected above.
[facility] - unknown provider
*  Additional services required to return to the preadmission environment?
No
*  Can the patient safely return to the preadmission environment?
Yes
*  Has this patient been hospitalized within the prior 30 days at any 
hospital?
No
 
 
 
 
 
 
 
 
Last DP export: 19   6:07 p
Patient Name: HYUN RODAS
Encounter #: [id number]
Page [id number]
 
 
 
 
 
Electronically Signed by JAKE MCKEON on 19 at 1914
 
 
 
 
 
 
**All edits/amendments must be made on the electronic document**
 
DICTATION DATE: 19     : BART  19     
RPT#: [id number]                                DC DATE:        
                                               STATUS: ADM IN  
[facility]
 [place] [address]
***END OF REPORT***

## 2019-08-13 NOTE — NUR
PT RESTING COMOFORTABLY WITH EYES CLOSED UPON ENTERING. EASILY AROUSED.
ADMINISTERED MEDICATION, NOT TROUBLE SWALLOWING. HUNG IV MEDICATION, NO
COMPLAINTS. DENIES ANY NEEDS AT THIS TIME. WILL CTM.

## 2019-08-13 NOTE — NUR
PT RESTING SUPINE WITH BED AT AN INCLINE WITH EYES CLOSED, BREATING EVEN AND
UNLABORED. NO S/S OF DISTRESS NOTED AT THIS TIME. PT HAS IV TO LEFT HAND WIT
HNS @ 100. 2L OF O2 VIA NASAL CANNULA. PT IS CONTROL AFIB ON THE HEART
MONITOR. WILL CTM.

## 2019-08-14 VITALS — DIASTOLIC BLOOD PRESSURE: 74 MMHG | SYSTOLIC BLOOD PRESSURE: 154 MMHG

## 2019-08-14 VITALS — DIASTOLIC BLOOD PRESSURE: 87 MMHG | SYSTOLIC BLOOD PRESSURE: 155 MMHG

## 2019-08-14 VITALS — DIASTOLIC BLOOD PRESSURE: 82 MMHG | SYSTOLIC BLOOD PRESSURE: 154 MMHG

## 2019-08-14 VITALS — DIASTOLIC BLOOD PRESSURE: 79 MMHG | SYSTOLIC BLOOD PRESSURE: 150 MMHG

## 2019-08-14 LAB
ANION GAP SERPL CALC-SCNC: 8.5 MMOL/L (ref 8–16)
BASOPHILS NFR BLD AUTO: 0.3 % (ref 0–2)
BUN SERPL-MCNC: 12 MG/DL (ref 7–18)
CALCIUM SERPL-MCNC: 8.8 MG/DL (ref 8.5–10.1)
CHLORIDE SERPL-SCNC: 103 MMOL/L (ref 98–107)
CO2 SERPL-SCNC: 31.2 MMOL/L (ref 21–32)
CREAT SERPL-MCNC: 1.5 MG/DL (ref 0.6–1.3)
EOSINOPHIL NFR BLD: 1.6 % (ref 0–7)
ERYTHROCYTE [DISTWIDTH] IN BLOOD BY AUTOMATED COUNT: 19.1 % (ref 11.5–14.5)
GLUCOSE SERPL-MCNC: 86 MG/DL (ref 74–106)
HCT VFR BLD CALC: 32.2 % (ref 36–48)
HGB BLD-MCNC: 10.3 G/DL (ref 12–16)
IMM GRANULOCYTES NFR BLD: 0.3 % (ref 0–5)
INR PPP: 2.31 (ref 0.85–1.17)
LYMPHOCYTES NFR BLD AUTO: 17.9 % (ref 15–50)
MAGNESIUM SERPL-MCNC: 2 MG/DL (ref 1.8–2.4)
MCH RBC QN AUTO: 21.2 PG (ref 26–34)
MCHC RBC AUTO-ENTMCNC: 32 G/DL (ref 31–37)
MCV RBC: 66.3 FL (ref 80–100)
MONOCYTES NFR BLD: 10.5 % (ref 2–11)
NEUTROPHILS NFR BLD AUTO: 69.4 % (ref 40–80)
OSMOLALITY SERPL CALC.SUM OF ELEC: 276 MOSM/KG (ref 275–300)
PHOSPHATE SERPL-MCNC: 3 MG/DL (ref 2.5–4.9)
PLATELET # BLD: 181 10X3/UL (ref 130–400)
POTASSIUM SERPL-SCNC: 3.7 MMOL/L (ref 3.5–5.1)
PROTHROMBIN TIME: 24.7 SECONDS (ref 11.6–15)
RBC # BLD AUTO: 4.86 10X6/UL (ref 4–5.4)
SODIUM SERPL-SCNC: 139 MMOL/L (ref 136–145)
WBC # BLD AUTO: 6.2 10X3/UL (ref 4.8–10.8)

## 2019-08-14 NOTE — NUR
REPORT RECEIVED. LYING IN BED WITH CL IN REACH ALERT RESP EVEN WITHOUT LABOR
O2 ON AT 2L/M PER N/C. REFUSES TO WEAR SCD'S. IV SITE TO LEFT HAND WITH NS AT
100CC/HR SITE CLEAR. BED LOCKED AND IN LOWEST POSITION. CAREPLAN REVIEW DONE
WITH SAFETY PRECAUTIONS IN PLACE

## 2019-08-14 NOTE — NUR
BED BATH GIVEN. NYSTATIN POWDER TO UNDER BREASTS, GROIN, AND IN
ABDOMINAL/PERIFOLDS SHE HAS RED RASH YEAST LIKE WITH SOME YELLOW DRAINAGE
NOTED. SHE IS ABLE TO HELP TURN A LITTLE FROM SIDE TO SIDE BUT HAS LIMITED
MOVEMENT. SHE MUST BE LIFTED UP IN THE BED BY TWO STAFF MEMBERS.

## 2019-08-14 NOTE — NUR
UNABLE TO COLLECT STOOL FOR SPECIMEN COLLECTION DUE TO ONLY HAD SMEAR OF BM ON
HER INCONTINENT PAD. SHE REFUSES TO LET ME COLLECT ANOTHER URINE FOR URINE
CULTURE AFTER BEING TOLD OF IN AND OUT CATH THAT NEEDED TO BE DONE TO COLLECT
IT.

## 2019-08-14 NOTE — MORECARE
CASE MANAGEMENT DISCHARGE SUMMARY
 
 
PATIENT: HYUN RODAS                      UNIT: [id number]
ACCOUNT#: [id number]                       ADM DATE: 19
AGE: 86     : 32  SEX: F            ROOM/BED: D.1204    
AUTHOR: LINDA,DOC                             PHYSICIAN:                               
 
REFERRING PHYSICIAN: RK NICHOLSON MD               
DATE OF SERVICE: 19
Discharge Plan
 
 
Patient Name: HYUN RODAS
Facility: [facility]:[place]
Encounter #: [id number]
Medical Record #: [id number]
: 1932
Planned Disposition: Home
Anticipated Discharge Date: 
 
Discharge Date: 
Expected LOS: 
Initial Reviewer: [email or web address]
Initial Review Date: 2019
Generated: 19   5:16 pm 
Comments
 
DCP- Discharge Planning
 
Updated by [email or web address]: Elvira Lacy on 19   6:09 pm CT
Patient Name: HYUN RODAS                                     
Admission Status: ER   
Accout number: [id number]                              
Admission Date: 2019   
: 1932                                                        
Admission Diagnosis:   
Attending: RK NICHOLSON                                                
Current LOS:  1   
  
Anticipated DC Date:    
Planned Disposition: Home   
Primary Insurance: [employer] ADV   
  
  
Discharge Planning Comments: CM met with patient at bedside after explaining 
CM role and obtaining verbal consent. Patient lives at home alone and plans 
to return there upon discharge.  Patient feels this would be a safe discharge.
 Patient states she has home 02, walker, w/c. Patient states that she has an 
aide that comes and helps with house cleaning and bathing.  CM discussed 
 
availability / needs of home health and medical equipment.  Patient denies 
any discharge needs at this time. Patient states she will have her family 
drive her home upon discharge.  CM will continue to follow and assist as 
needed with discharge planning / needs.  
  
  
  
  
  
  
: Elvira Lacy
 DCPIA - Discharge Planning Initial Assessment
 
Updated by [email or web address]: Elvira Lacy on 19   7:05 pm
*  Is the patient Alert and Oriented?
Yes
*  How many steps to enter\exit or inside your home?
 
*  PCP
[facility]
*  Pharmacy
[facility]
*  Preadmission Environment
Home Alone
*  ADLs
Partial Dependent
*  Other Equipment
walker, w/c and home 02
*  List name and contact numbers for known caregivers / representatives who 
currently or will assist patient after discharge:
Gabby Blas daughter - [phone], [phone]
*  Verbal permission to speak to the caregivers and representatives has been 
obtained from the patient.
Yes
*  Community resources currently utilized
Other
*  Please name any agencies selected above.
[facility] - unknown provider
*  Additional services required to return to the preadmission environment?
No
*  Can the patient safely return to the preadmission environment?
Yes
*  Has this patient been hospitalized within the prior 30 days at any 
hospital?
No
 
External Providers
External Provider: OTHER-OTHER
 
Next Contact Date: 
Service Request Date: 
Service Type: 
 
Resolution: 
 
Reviewer: 
Comments: 
 
 
 
 
 
 
Last DP export: 19   6:14 p
Patient Name: HYUN RODAS
Encounter #: [id number]
Page [id number]
 
 
 
 
 
Electronically Signed by JAKE MCKEON on 19 at 1616
 
 
 
 
 
 
**All edits/amendments must be made on the electronic document**
 
DICTATION DATE: 19     : BART  19 1615     
RPT#: [id number]                                ND DATE:        
                                               STATUS: ADM IN  
[facility]
 [place] [address]
***END OF REPORT***

## 2019-08-14 NOTE — NUR
SHE WAS RESTING WITH EASE BUT AROUSED WHEN I ENTERED THE ROOM. TOOK MEDICATION
WITHOUT DIFF. TAKING PO FLUIDS WELL. DENIES ANY CURRENT NEEDS. CL IN REACH

## 2019-08-14 NOTE — MORECARE
CASE MANAGEMENT DISCHARGE SUMMARY
 
 
PATIENT: HYUN RODAS                      UNIT: [id number]
ACCOUNT#: [id number]                       ADM DATE: 19
AGE: 86     : 32  SEX: F            ROOM/BED: D.1204    
AUTHOR: LINDA,DOC                             PHYSICIAN:                               
 
REFERRING PHYSICIAN: RK NICHOLSON MD               
DATE OF SERVICE: 19
Discharge Plan
 
 
Patient Name: HYUN RODAS
Facility: [facility]:[place]
Encounter #: [id number]
Medical Record #: [id number]
: 1932
Planned Disposition: Home
Anticipated Discharge Date: 
 
Discharge Date: 
Expected LOS: 
Initial Reviewer: [email or web address]
Initial Review Date: 2019
Generated: 19   9:13 pm 
Comments
 
DCP- Discharge Planning
 
Updated by [email or web address]: Elvira Lacy on 19   7:09 pm CT
CM spoke with patient and daughter's  ADELIA signed requesting Inpatient Rehab 
as first option and if patient is required to go to SNF for rehab [facility] was choice.  Patient isn't able to ambulate at all and she hasn't for 
a long while.  [facility] 
up until last week after a fall.  Patient will not be able to tolerate 3 
hours of therapy for inpatient rehab. CM will contact [facility] in am.
 CM will continue to follow and assist as needed with discharge planning / 
needs.
DCP- Discharge Planning
 
Updated by [email or web address]: Elvira Lacy on 19   6:09 pm CT
Patient Name: HYUN RODAS                                     
Admission Status: ER   
Accout number: [id number]                              
Admission Date: 2019   
: 1932                                                        
Admission Diagnosis:   
Attending: RK NICHOLSON                                                
Current LOS:  1   
 
  
Anticipated DC Date:    
Planned Disposition: Home   
Primary Insurance: [employer] ADV   
  
  
Discharge Planning Comments: CM met with patient at bedside after explaining 
CM role and obtaining verbal consent. Patient lives at home alone and plans 
to return there upon discharge.  Patient feels this would be a safe discharge.
 Patient states she has home 02, walker, w/c. Patient states that she has an 
aide that comes and helps with house cleaning and bathing.  CM discussed 
availability / needs of home health and medical equipment.  Patient denies 
any discharge needs at this time. Patient states she will have her family 
drive her home upon discharge.  CM will continue to follow and assist as 
needed with discharge planning / needs.  
  
  
  
  
  
  
: Elvira WELLS - Discharge Planning Initial Assessment
 
Updated by [id number]: Elvira Lacy on 19   7:05 pm
*  Is the patient Alert and Oriented?
Yes
*  How many steps to enter\exit or inside your home?
 
*  PCP
[facility]
*  Pharmacy
[facility]
*  Preadmission Environment
Home Alone
*  ADLs
Partial Dependent
*  Other Equipment
walker, w/c and home 02
*  List name and contact numbers for known caregivers / representatives who 
currently or will assist patient after discharge:
Gabby Jimenes - daughter - [phone], [phone]
*  Verbal permission to speak to the caregivers and representatives has been 
obtained from the patient.
Yes
*  Community resources currently utilized
Other
*  Please name any agencies selected above.
[facility] - unknown provider
*  Additional services required to return to the preadmission environment?
No
*  Can the patient safely return to the preadmission environment?
 
Yes
*  Has this patient been hospitalized within the prior 30 days at any 
hospital?
No
 
 
 
 
 
Coverage Notice
 
Reviewer: [email or web address] - Elvira Lacy
 
Notice Issued Date-Time: 2019  13:30
Notice Type: Patient Choice Letter
 
Notice Delivered To: Family Member
Relationship to Patient: Daughter
Representative Name: 
 
Delivery Method: HAND - Hand Delivered
Julissa Days:
Prior Verbal Notification: 
 
Recipient Understood Notice: Yes
Recipient Signature: Yes
Med Rec Note Co-signed by Attending:
 
Coverage Notice Comment:  
 
Last DP export: 19   3:16 p
Patient Name: HYUN RODAS
Encounter #: [id number]
Page [id number]
 
 
 
 
 
Electronically Signed by JAKE MCKEON on 19 at 2013
 
 
 
 
 
 
**All edits/amendments must be made on the electronic document**
 
DICTATION DATE: 19     : BART  19 2013     
RPT#: [id number]                                DC DATE:        
                                               STATUS: ADM IN  
[facility]
[address] [place] [address]
***END OF REPORT***

## 2019-08-15 VITALS — DIASTOLIC BLOOD PRESSURE: 81 MMHG | SYSTOLIC BLOOD PRESSURE: 165 MMHG

## 2019-08-15 VITALS — SYSTOLIC BLOOD PRESSURE: 152 MMHG | DIASTOLIC BLOOD PRESSURE: 84 MMHG

## 2019-08-15 VITALS — DIASTOLIC BLOOD PRESSURE: 84 MMHG | SYSTOLIC BLOOD PRESSURE: 152 MMHG

## 2019-08-15 VITALS — DIASTOLIC BLOOD PRESSURE: 60 MMHG | SYSTOLIC BLOOD PRESSURE: 123 MMHG

## 2019-08-15 VITALS — SYSTOLIC BLOOD PRESSURE: 111 MMHG | DIASTOLIC BLOOD PRESSURE: 66 MMHG

## 2019-08-15 VITALS — SYSTOLIC BLOOD PRESSURE: 133 MMHG | DIASTOLIC BLOOD PRESSURE: 65 MMHG

## 2019-08-15 VITALS — DIASTOLIC BLOOD PRESSURE: 85 MMHG | SYSTOLIC BLOOD PRESSURE: 166 MMHG

## 2019-08-15 VITALS — SYSTOLIC BLOOD PRESSURE: 166 MMHG | DIASTOLIC BLOOD PRESSURE: 85 MMHG

## 2019-08-15 LAB
ANION GAP SERPL CALC-SCNC: 8.5 MMOL/L (ref 8–16)
BASOPHILS NFR BLD AUTO: 0.4 % (ref 0–2)
BUN SERPL-MCNC: 14 MG/DL (ref 7–18)
CALCIUM SERPL-MCNC: 8.6 MG/DL (ref 8.5–10.1)
CHLORIDE SERPL-SCNC: 105 MMOL/L (ref 98–107)
CO2 SERPL-SCNC: 31.4 MMOL/L (ref 21–32)
CREAT SERPL-MCNC: 1.5 MG/DL (ref 0.6–1.3)
EOSINOPHIL NFR BLD: 2 % (ref 0–7)
ERYTHROCYTE [DISTWIDTH] IN BLOOD BY AUTOMATED COUNT: 19.5 % (ref 11.5–14.5)
GLUCOSE SERPL-MCNC: 103 MG/DL (ref 74–106)
HCT VFR BLD CALC: 30.7 % (ref 36–48)
HGB BLD-MCNC: 9.6 G/DL (ref 12–16)
IMM GRANULOCYTES NFR BLD: 0.4 % (ref 0–5)
INR PPP: 2.47 (ref 0.85–1.17)
LYMPHOCYTES NFR BLD AUTO: 18.2 % (ref 15–50)
MAGNESIUM SERPL-MCNC: 1.7 MG/DL (ref 1.8–2.4)
MCH RBC QN AUTO: 21.1 PG (ref 26–34)
MCHC RBC AUTO-ENTMCNC: 31.3 G/DL (ref 31–37)
MCV RBC: 67.3 FL (ref 80–100)
MONOCYTES NFR BLD: 9.8 % (ref 2–11)
NEUTROPHILS NFR BLD AUTO: 69.2 % (ref 40–80)
OSMOLALITY SERPL CALC.SUM OF ELEC: 281 MOSM/KG (ref 275–300)
PHOSPHATE SERPL-MCNC: 3.3 MG/DL (ref 2.5–4.9)
PLATELET # BLD: 182 10X3/UL (ref 130–400)
POTASSIUM SERPL-SCNC: 3.9 MMOL/L (ref 3.5–5.1)
PROTHROMBIN TIME: 26 SECONDS (ref 11.6–15)
RBC # BLD AUTO: 4.56 10X6/UL (ref 4–5.4)
SODIUM SERPL-SCNC: 141 MMOL/L (ref 136–145)
WBC # BLD AUTO: 5.6 10X3/UL (ref 4.8–10.8)

## 2019-08-15 NOTE — MORECARE
CASE MANAGEMENT DISCHARGE SUMMARY
 
 
PATIENT: HYUN RODAS                      UNIT: [id number]
ACCOUNT#: [id number]                       ADM DATE: 19
AGE: 86     : 32  SEX: F            ROOM/BED: D.1204    
AUTHOR: LINDA,DOC                             PHYSICIAN:                               
 
REFERRING PHYSICIAN: RK NICHOLSON MD               
DATE OF SERVICE: 08/15/19
Discharge Plan
 
 
Patient Name: HYUN RODAS
Facility: [facility]:[place]
Encounter #: [id number]
Medical Record #: [id number]
: 1932
Planned Disposition: Home
Anticipated Discharge Date: 
 
Discharge Date: 
Expected LOS: 
Initial Reviewer: [email or web address]
Initial Review Date: 2019
Generated: 8/15/19   2:07 pm 
Comments
 
DCP- Discharge Planning
 
Updated by [email or web address]: Elvira Lacy on 19   7:09 pm CT
CM spoke with patient and daughter's  ADELIA signed requesting Inpatient Rehab 
as first option and if patient is required to go to SNF for rehab [facility] was choice.  Patient isn't able to ambulate at all and she hasn't for 
a long while.  [facility] 
up until last week after a fall.  Patient will not be able to tolerate 3 
hours of therapy for inpatient rehab. CM will contact [facility] in am.
 CM will continue to follow and assist as needed with discharge planning / 
needs.
DCP- Discharge Planning
 
Updated by [id number]: Elvira Lacy on 19   6:09 pm CT
Patient Name: HYUN ROADS                                     
Admission Status: ER   
Accout number: [id number]                              
Admission Date: 2019   
: 1932                                                        
Admission Diagnosis:   
Attending: RK NICHOLSON                                                
Current LOS:  1   
 
  
Anticipated DC Date:    
Planned Disposition: Home   
Primary Insurance: [employer] ADV   
  
  
Discharge Planning Comments: CM met with patient at bedside after explaining 
CM role and obtaining verbal consent. Patient lives at home alone and plans 
to return there upon discharge.  Patient feels this would be a safe discharge.
 Patient states she has home 02, walker, w/c. Patient states that she has an 
aide that comes and helps with house cleaning and bathing.  CM discussed 
availability / needs of home health and medical equipment.  Patient denies 
any discharge needs at this time. Patient states she will have her family 
drive her home upon discharge.  CM will continue to follow and assist as 
needed with discharge planning / needs.  
  
  
  
  
  
  
: Elvira WELLS - Discharge Planning Initial Assessment
 
Updated by [email or web address]: Elvira Lacy on 19   7:05 pm
*  Is the patient Alert and Oriented?
Yes
*  How many steps to enter\exit or inside your home?
 
*  PCP
[facility]
*  Pharmacy
[facility]
*  Preadmission Environment
Home Alone
*  ADLs
Partial Dependent
*  Other Equipment
walker, w/c and home 02
*  List name and contact numbers for known caregivers / representatives who 
currently or will assist patient after discharge:
Gabby Jimenes - daughter - [phone], [phone]
*  Verbal permission to speak to the caregivers and representatives has been 
obtained from the patient.
Yes
*  Community resources currently utilized
Other
*  Please name any agencies selected above.
[facility] - unknown provider
*  Additional services required to return to the preadmission environment?
No
*  Can the patient safely return to the preadmission environment?
 
Yes
*  Has this patient been hospitalized within the prior 30 days at any 
hospital?
No
 
External Providers
External Provider: [facility]
 
Next Contact Date: 
Service Request Date: 
Service Type: 
Resolution: 
 
Reviewer: 
Comments: 
 
 
 
 
Coverage Notice
 
Reviewer: [email or web address] - Elvira Lacy
 
Notice Issued Date-Time: 2019  13:30
Notice Type: Patient Choice Letter
 
Notice Delivered To: Family Member
Relationship to Patient: Daughter
Representative Name: 
 
Delivery Method: HAND - Hand Delivered
Julissa Days:
Prior Verbal Notification: 
 
Recipient Understood Notice: Yes
Recipient Signature: Yes
Med Rec Note Co-signed by Attending:
 
Coverage Notice Comment:  
 
Last DP export: 19   7:13 p
Patient Name: HYUN RODAS
 
Encounter #: [id number]
Page [phone]
 
 
 
 
 
Electronically Signed by JAKE MCKEON on 08/15/19 at 1307
 
 
 
 
 
 
**All edits/amendments must be made on the electronic document**
 
DICTATION DATE: 08/15/19 1307     : BART  08/15/19 1307     
RPT#: [id number]                                DC DATE:        
                                               STATUS: ADM IN  
[facility]
[address] [place] [address]
***END OF REPORT***

## 2019-08-15 NOTE — NUR
SHIFT ASSESSMENT COMPLETED, PT CARE ASSUMED. PT AWAKE AND ALERT, DENIES ANY
COMPLAINT OF PAIN OR DISCOMFORT AT THIS TIME, CALL LIGHT WITHIN REACH, WILL
CONTINUE TO OBSERVE.

## 2019-08-15 NOTE — MORECARE
CASE MANAGEMENT DISCHARGE SUMMARY
 
 
PATIENT: HYUN RODAS                      UNIT: [id number]
ACCOUNT#: [id number]                       ADM DATE: 19
AGE: 86     : 32  SEX: F            ROOM/BED: D.1204    
AUTHOR: LINDA,DOC                             PHYSICIAN:                               
 
REFERRING PHYSICIAN: RK NICHOLSON MD               
DATE OF SERVICE: 08/15/19
Discharge Plan
 
 
Patient Name: HYUN RODAS
Facility: [facility]
Encounter #: [id number]
Medical Record #: [id number]
: 1932
Planned Disposition: Home
Anticipated Discharge Date: 
 
Discharge Date: 
Expected LOS: 
Initial Reviewer: [id number]
Initial Review Date: 2019
Generated: 8/15/19   5:39 pm 
Comments
 
DCP- Discharge Planning
 
Updated by [id number]: Elvira Lacy on 8/15/19   3:39 pm CT
CM called and spoke with Yasmeen at [facility] [phone] and fax [phone].  CM fax over records and explained to Yasmeen that 
patient has [employer].  Yasmeen stated she would get started on it as soon as it 
arrives. CM will continue to follow and assist as needed with discharge 
planning needs.
DCP- Discharge Planning
 
Updated by [id number]: Elvira Lacy on 19   7:09 pm CT
CM spoke with patient and daughter's  ADELIA signed requesting Inpatient Rehab 
as first option and if patient is required to go to SNF for rehab [facility] was choice.  Patient isn't able to ambulate at all and she hasn't for 
a long while.  She was able to transfer from [facility] to electric w/c alone 
up until last week after a fall.  Patient will not be able to tolerate 3 
hours of therapy for inpatient rehab. CM will contact [facility] in am.
 CM will continue to follow and assist as needed with discharge planning / 
needs.
DCP- Discharge Planning
 
Updated by [id number]: Elvira Lacy on 19   6:09 pm CT
 
Patient Name: HYUN RODAS                                     
Admission Status: ER   
Accout number: [id number]                              
Admission Date: 2019   
: 1932                                                        
Admission Diagnosis:   
Attending: RK NICHOLSON                                                
Current LOS:  1   
  
Anticipated DC Date:    
Planned Disposition: Home   
Primary Insurance: [employer] ADV   
  
  
Discharge Planning Comments: CM met with patient at bedside after explaining 
CM role and obtaining verbal consent. Patient lives at home alone and plans 
to return there upon discharge.  Patient feels this would be a safe discharge.
 Patient states she has home 02, walker, w/c. Patient states that she has an 
aide that comes and helps with house cleaning and bathing.  CM discussed 
availability / needs of home health and medical equipment.  Patient denies 
any discharge needs at this time. Patient states she will have her family 
drive her home upon discharge.  CM will continue to follow and assist as 
needed with discharge planning / needs.  
  
  
  
  
  
  
: Elvira Lacy DCPIA - Discharge Planning Initial Assessment
 
Updated by [id number]: Elvira Lacy on 19   7:05 pm
*  Is the patient Alert and Oriented?
Yes
*  How many steps to enter\exit or inside your home?
 
*  PCP
[facility]
*  Pharmacy
[facility]
*  Preadmission Environment
Home Alone
*  ADLs
Partial Dependent
*  Other Equipment
walker, w/c and home 02
*  List name and contact numbers for known caregivers / representatives who 
currently or will assist patient after discharge:
Gabby Jimenes - daughter - [phone], [phone]
*  Verbal permission to speak to the caregivers and representatives has been 
obtained from the patient.
 
Yes
*  Community resources currently utilized
Other
*  Please name any agencies selected above.
aide care - unknown provider
*  Additional services required to return to the preadmission environment?
No
*  Can the patient safely return to the preadmission environment?
Yes
*  Has this patient been hospitalized within the prior 30 days at any 
hospital?
No
 
 
 
 
 
Coverage Notice
 
Reviewer: [email or web address] - Elvira Lacy
 
Notice Issued Date-Time: 2019  13:30
Notice Type: Patient Choice Letter
 
Notice Delivered To: Family Member
Relationship to Patient: Daughter
Representative Name: 
 
Delivery Method: HAND - Hand Delivered
Julissa Days:
Prior Verbal Notification: 
 
Recipient Understood Notice: Yes
Recipient Signature: Yes
Med Rec Note Co-signed by Attending:
 
Coverage Notice Comment:  
 
Last DP export: 8/15/19  12:07 p
Patient Name: HYUN RODAS
 
Encounter #: [id number]
Page [id number]
 
 
 
 
 
Electronically Signed by JAKE MCKEON on 08/15/19 at 1640
 
 
 
 
 
 
**All edits/amendments must be made on the electronic document**
 
DICTATION DATE: 08/15/19 1639     : BART  08/15/19 163     
RPT#: [id number]                                NC DATE:        
                                               STATUS: ADM IN  
[facility]
 [place] [address]
***END OF REPORT***

## 2019-08-15 NOTE — NUR
NUTRITION F//U
CHART REVIEWED, PT VISIT. TOLERATING AHA DIET WITH ~50% INTAKE RECENT MEALS.
WILL CONTINUE TO PROVIDE DIET, MONITOR PO INTAKE.
RD FOLLOWING

## 2019-08-16 VITALS — SYSTOLIC BLOOD PRESSURE: 125 MMHG | DIASTOLIC BLOOD PRESSURE: 60 MMHG

## 2019-08-16 VITALS — DIASTOLIC BLOOD PRESSURE: 67 MMHG | SYSTOLIC BLOOD PRESSURE: 132 MMHG

## 2019-08-16 VITALS — SYSTOLIC BLOOD PRESSURE: 104 MMHG | DIASTOLIC BLOOD PRESSURE: 65 MMHG

## 2019-08-16 VITALS — DIASTOLIC BLOOD PRESSURE: 66 MMHG | SYSTOLIC BLOOD PRESSURE: 113 MMHG

## 2019-08-16 LAB
ANION GAP SERPL CALC-SCNC: 8.1 MMOL/L (ref 8–16)
BASOPHILS NFR BLD AUTO: 0.7 % (ref 0–2)
BUN SERPL-MCNC: 14 MG/DL (ref 7–18)
CALCIUM SERPL-MCNC: 8.9 MG/DL (ref 8.5–10.1)
CHLORIDE SERPL-SCNC: 102 MMOL/L (ref 98–107)
CO2 SERPL-SCNC: 30.8 MMOL/L (ref 21–32)
CREAT SERPL-MCNC: 1.5 MG/DL (ref 0.6–1.3)
EOSINOPHIL NFR BLD: 2.1 % (ref 0–7)
ERYTHROCYTE [DISTWIDTH] IN BLOOD BY AUTOMATED COUNT: 19.2 % (ref 11.5–14.5)
GLUCOSE SERPL-MCNC: 91 MG/DL (ref 74–106)
HCT VFR BLD CALC: 31.4 % (ref 36–48)
HGB BLD-MCNC: 9.9 G/DL (ref 12–16)
IMM GRANULOCYTES NFR BLD: 0.3 % (ref 0–5)
INR PPP: 2.83 (ref 0.85–1.17)
LYMPHOCYTES NFR BLD AUTO: 21.7 % (ref 15–50)
MAGNESIUM SERPL-MCNC: 2 MG/DL (ref 1.8–2.4)
MCH RBC QN AUTO: 21.2 PG (ref 26–34)
MCHC RBC AUTO-ENTMCNC: 31.5 G/DL (ref 31–37)
MCV RBC: 67.1 FL (ref 80–100)
MONOCYTES NFR BLD: 10.1 % (ref 2–11)
NEUTROPHILS NFR BLD AUTO: 65.1 % (ref 40–80)
OSMOLALITY SERPL CALC.SUM OF ELEC: 274 MOSM/KG (ref 275–300)
PHOSPHATE SERPL-MCNC: 3.1 MG/DL (ref 2.5–4.9)
PLATELET # BLD: 172 10X3/UL (ref 130–400)
POTASSIUM SERPL-SCNC: 3.9 MMOL/L (ref 3.5–5.1)
PROTHROMBIN TIME: 29 SECONDS (ref 11.6–15)
RBC # BLD AUTO: 4.68 10X6/UL (ref 4–5.4)
SODIUM SERPL-SCNC: 137 MMOL/L (ref 136–145)
WBC # BLD AUTO: 6.1 10X3/UL (ref 4.8–10.8)

## 2019-08-16 NOTE — NUR
PT URINATED IN THE BED. PT CLEANED AND DRIED. BED LINENS [facility]. [facility]  [facility]. PT TRANSPORTED TO WHEELCHAIR WITH 3X
ASSIST. ALL BELONGING SENT WITH PT. SON SIGNED D/C PAPERWORK PER VERBAL
CONSENT OF PT. IV D/C WITH CATHETER TIP INTACT. DRESSING PLACED OVER SITE,
CDI. BRIEF PLACED ON PT AND TWO GOWNS PLACED ON PT TO COVER PER DAUGHTER
REQUEST. [facility] SHERI RN RECIEVED REPORT.

## 2019-08-16 NOTE — MORECARE
CASE MANAGEMENT DISCHARGE SUMMARY
 
 
PATIENT: HYUN RODAS                      UNIT: [id number]
ACCOUNT#: [id number]                       ADM DATE: 19
AGE: 86     : 32  SEX: F            ROOM/BED: D.1204    
AUTHOR: LINDA,DOC                             PHYSICIAN:                               
 
REFERRING PHYSICIAN: RK NICHOLSON MD               
DATE OF SERVICE: 19
Discharge Plan
 
 
Patient Name: HYUN RODAS
Facility: [facility]
Encounter #: [id number]
Medical Record #: [id number]
: 1932
Planned Disposition: Home
Anticipated Discharge Date: 19
 
Discharge Date: 2019
Expected LOS: 4
Initial Reviewer: [id number]
Initial Review Date: 2019
Generated: 19   6:52 pm 
DCP- Discharge Planning
 
Updated by [id number]: Elvira Lacy on 8/15/19   3:39 pm CT
CM called and spoke with Yasmeen at [facility] [phone] and fax [phone].  CM fax over records and explained to Yasmeen that 
patient has [employer].  Yasmeen stated she would get started on it as soon as it 
arrives. CM will continue to follow and assist as needed with discharge 
planning needs.
DCP- Discharge Planning
 
Updated by [id number]: Elvira Lacy on 19   7:09 pm CT
CM spoke with patient and daughter's  ADELIA signed requesting Inpatient Rehab 
as first option and if patient is required to go to SNF for rehab [facility] was choice.  Patient isn't able to ambulate at all and she hasn't for 
a long while.  She was able to transfer from recliner to electric w/c alone 
up until last week after a fall.  Patient will not be able to tolerate 3 
hours of therapy for inpatient rehab. CM will contact [facility] in am.
 CM will continue to follow and assist as needed with discharge planning / 
needs.
DCP- Discharge Planning
 
Updated by [id number]: Elvira Lacy on 19   6:09 pm CT
Patient Name: HYUN RODAS                                     
Admission Status: ER   
 
Accout number: [id number]                              
Admission Date: 2019   
: 1932                                                        
Admission Diagnosis:   
Attending: RK NICHOLSON                                                
Current LOS:  1   
  
Anticipated DC Date:    
Planned Disposition: Home   
Primary Insurance: [employer] ADV   
  
  
Discharge Planning Comments: CM met with patient at bedside after explaining 
CM role and obtaining verbal consent. Patient lives at home alone and plans 
to return there upon discharge.  Patient feels this would be a safe discharge.
 Patient states she has home 02, walker, w/c. Patient states that she has an 
aide that comes and helps with house cleaning and bathing.  CM discussed 
availability / needs of home health and medical equipment.  Patient denies 
any discharge needs at this time. Patient states she will have her family 
drive her home upon discharge.  CM will continue to follow and assist as 
needed with discharge planning / needs.  
  
  
  
  
  
  
: Elvira [facility] - Discharge Planning Initial Assessment
 
Updated by [id number]: Elvira Lacy on 19   7:05 pm
*  Is the patient Alert and Oriented?
Yes
*  How many steps to enter\exit or inside your home?
 
*  PCP
[facility]
*  Pharmacy
[facility]
*  Preadmission Environment
Home Alone
*  ADLs
Partial Dependent
*  Other Equipment
walker, w/c and home 02
*  List name and contact numbers for known caregivers / representatives who 
currently or will assist patient after discharge:
Gabby Jimenes - daughter - [phone], [phone]
*  Verbal permission to speak to the caregivers and representatives has been 
obtained from the patient.
Yes
*  Community resources currently utilized
 
Other
*  Please name any agencies selected above.
aide care - unknown provider
*  Additional services required to return to the preadmission environment?
No
*  Can the patient safely return to the preadmission environment?
Yes
*  Has this patient been hospitalized within the prior 30 days at any 
hospital?
No
 
 
 
 
 
Coverage Notice
 
Reviewer: [email or web address] - Elvria Lacy
 
Notice Issued Date-Time: 2019  13:30
Notice Type: Patient Choice Letter
 
Notice Delivered To: Family Member
Relationship to Patient: Daughter
Representative Name: 
 
Delivery Method: HAND - Hand Delivered
Julissa Days:
Prior Verbal Notification: 
 
Recipient Understood Notice: Yes
Recipient Signature: Yes
Med Rec Note Co-signed by Attending:
 
Coverage Notice Comment:  
 
Last DP export: 8/15/19   3:40 p
Patient Name: HYUN RODAS
 
Encounter #: [id number]
Page [id number]
 
 
 
 
 
Electronically Signed by JAKE MCKEON on 19 at 1752
 
 
 
 
 
 
**All edits/amendments must be made on the electronic document**
 
DICTATION DATE: 19     : BART  19     
RPT#: [id number]                                DC DATE:19
                                               STATUS: DIS IN  
[facility]
[place] [address]
***END OF REPORT***

## 2019-08-16 NOTE — MORECARE
CASE MANAGEMENT DISCHARGE SUMMARY
 
 
PATIENT: HYUN RODAS                      UNIT: [id number]
ACCOUNT#: [id number]                       ADM DATE: 19
AGE: 86     : 32  SEX: F            ROOM/BED: D.1204    
AUTHOR: LINDA,DOC                             PHYSICIAN:                               
 
REFERRING PHYSICIAN: RK NICHOLSON MD               
DATE OF SERVICE: 19
Discharge Plan
 
 
Patient Name: HYUN RODAS
Facility: [facility]
Encounter #: [id number]
Medical Record #: [id number]
: 1932
Planned Disposition: Home
Anticipated Discharge Date: 19
 
Discharge Date: 2019
Expected LOS: 4
Initial Reviewer: [id number]
Initial Review Date: 2019
Generated: 19   7:09 pm 
Comments
 
DCP- Discharge Planning
 
Updated by [id number]: Tammy Freeman on 19   5:04 pm CT
LATE ENTRY 1400  
CM [facility]  [facility].  
CM SPOKE WITH WHINTEY AT FACILITY.   
VERIFIED THE PLAN FOR ADMISSION TODAY.  
CM FAXED DISCHARGE MED LIST, LAB AND DISCHARGE SUMMARY [phone] TO THE 
ATTENTION OF WHITNEY.   
TRANSPORTATION WAS PROVIDED BY FACILITY AT 1430.
DCP- Discharge Planning
 
Updated by [id number]: Maximus Lacy on 8/15/19   3:39 pm CT
CM called and spoke with Yasmeen at [facility] [phone] and fax [phone].  CM fax over records and explained to Yasmeen that 
patient has [employer].  Yasmeen stated she would get started on it as soon as it 
arrives. CM will continue to follow and assist as needed with discharge 
planning needs.
DCP- Discharge Planning
 
Updated by [id number]: Maximus Lacy on 19   7:09 pm CT
 
CM spoke with patient and daughter's  ADELIA signed requesting Inpatient Rehab 
as first option and if patient is required to go to SNF for rehab [facility] was choice.  Patient isn't able to ambulate at all and she hasn't for 
a long while.  She was able to transfer from recliner to electric w/c alone 
up until last week after a fall.  Patient will not be able to tolerate 3 
hours of therapy for inpatient rehab. CM will contact [facility] in am.
 CM will continue to follow and assist as needed with discharge planning / 
needs.
DCP- Discharge Planning
 
Updated by [id number]: Maximus Lacy on 19   6:09 pm CT
Patient Name: HYUN RODAS                                     
Admission Status: ER   
Accout number: [id number]                              
Admission Date: 2019   
: 1932                                                        
Admission Diagnosis:   
Attending: RK NICHOLSON                                                
Current LOS:  1   
  
Anticipated DC Date:    
Planned Disposition: Home   
Primary Insurance: [employer] ADV   
  
  
Discharge Planning Comments: CM met with patient at bedside after explaining 
CM role and obtaining verbal consent. Patient lives at home alone and plans 
to return there upon discharge.  Patient feels this would be a safe discharge.
 Patient states she has home 02, walker, w/c. Patient states that she has an 
aide that comes and helps with house cleaning and bathing.  CM discussed 
availability / needs of home health and medical equipment.  Patient denies 
any discharge needs at this time. Patient states she will have her family 
drive her home upon discharge.  CM will continue to follow and assist as 
needed with discharge planning / needs.  
  
  
  
  
  
  
: Maximus Lacy
 DCPIA - Discharge Planning Initial Assessment
 
Updated by [id number]: Maximus Lacy on 19   7:05 pm
*  Is the patient Alert and Oriented?
Yes
*  How many steps to enter\exit or inside your home?
 
*  PCP
[facility]
*  Pharmacy
[facility]
 
*  Preadmission Environment
Home Alone
*  ADLs
Partial Dependent
*  Other Equipment
walker, w/c and home 02
*  List name and contact numbers for known caregivers / representatives who 
currently or will assist patient after discharge:
Gabby Jimenes - daughter - [phone], [phone]
*  Verbal permission to speak to the caregivers and representatives has been 
obtained from the patient.
Yes
*  Community resources currently utilized
Other
*  Please name any agencies selected above.
aide care - unknown provider
*  Additional services required to return to the preadmission environment?
No
*  Can the patient safely return to the preadmission environment?
Yes
*  Has this patient been hospitalized within the prior 30 days at any 
hospital?
No
 
 
 
 
 
Coverage Notice
 
Reviewer: [email or web address] Wan Lacy
 
Notice Issued Date-Time: 2019  13:30
Notice Type: Patient Choice Letter
 
Notice Delivered To: Family Member
Relationship to Patient: Daughter
Representative Name: 
 
Delivery Method: HAND - Hand Delivered
Julissa Days:
Prior Verbal Notification: 
 
Recipient Understood Notice: Yes
Recipient Signature: Yes
Med Rec Note Co-signed by Attending:
 
Coverage Notice Comment:  
 
Last DP export: 19   5:01 p
Patient Name: HYUN RODAS
 
Encounter #: [id number]
Page [id number]
 
 
 
 
 
Electronically Signed by JAKE MCKEON on 19 at 1809
 
 
 
 
 
 
**All edits/amendments must be made on the electronic document**
 
DICTATION DATE: 19     : BART  19     
RPT#: [id number]                                DC DATE:19
                                               STATUS: DIS IN  
[facility]
[address] [place] [address]
***END OF REPORT***

## 2019-08-16 NOTE — MORECARE
CASE MANAGEMENT DISCHARGE SUMMARY
 
 
PATIENT: HYUN RODAS                      UNIT: [id number]
ACCOUNT#: [id number]                       ADM DATE: 19
AGE: 86     : 32  SEX: F            ROOM/BED: D.1204    
AUTHOR: LINDA,DOC                             PHYSICIAN:                               
 
REFERRING PHYSICIAN: RK NICHOLSON MD               
DATE OF SERVICE: 19
Discharge Plan
 
 
Patient Name: HYUN RODAS
Facility: [facility]:[place]
Encounter #: [id number]
Medical Record #: [id number]
: 1932
Planned Disposition: Home
Anticipated Discharge Date: 19
 
Discharge Date: 2019
Expected LOS: 4
Initial Reviewer: [id number]
Initial Review Date: 2019
Generated: 19   7:01 pm 
Comments
 
DCP- Discharge Planning
 
Updated by [id number]: Tammy Freeman on 19   5:00 pm CT
LATE ENTRY 1400  
CM RECE[facility]  [facility].  
CM SPOKE WITH WHITNEY AT FACILITY.   
VERIFIED THE PLAN FOR ADMISSION TODAY.  
CM FAXED DISCHARGE MED LIST, LAB AND DISCHARGE SUMMARY [phone] TO THE 
ATTENTION OF WHITNEY.   
TRANSPORTATION WAS PROVIDED BY FACILITY AT 1330.
DCP- Discharge Planning
 
Updated by [id number]: Maximus Lacy on 8/15/19   3:39 pm CT
CM called and spoke with Yasmeen at [facility] [phone] and fax [phone].  CM fax over records and explained to Yasmeen that 
patient has [employer].  Yasmeen stated she would get started on it as soon as it 
arrives. CM will continue to follow and assist as needed with discharge 
planning needs.
DCP- Discharge Planning
 
Updated by [id number]: Maximus Lacy on 19   7:09 pm CT
 
CM spoke with patient and daughter's  ADELIA signed requesting Inpatient Rehab 
as first option and if patient is required to go to SNF for rehab [facility] was choice.  Patient isn't able to ambulate at all and she hasn't for 
a long while.  She was able to transfer from recliner to electric w/c alone 
up until last week after a fall.  Patient will not be able to tolerate 3 
hours of therapy for inpatient rehab. CM will contact [facility] in am.
 CM will continue to follow and assist as needed with discharge planning / 
needs.
DCP- Discharge Planning
 
Updated by [id number]: Maximus Lacy on 19   6:09 pm CT
Patient Name: HYUN RODAS                                     
Admission Status: ER   
Accout number: [id number]                              
Admission Date: 2019   
: 1932                                                        
Admission Diagnosis:   
Attending: RK NICHOLSON                                                
Current LOS:  1   
  
Anticipated DC Date:    
Planned Disposition: Home   
Primary Insurance: [employer] ADV   
  
  
Discharge Planning Comments: CM met with patient at bedside after explaining 
CM role and obtaining verbal consent. Patient lives at home alone and plans 
to return there upon discharge.  Patient feels this would be a safe discharge.
 Patient states she has home 02, walker, w/c. Patient states that she has an 
aide that comes and helps with house cleaning and bathing.  CM discussed 
availability / needs of home health and medical equipment.  Patient denies 
any discharge needs at this time. Patient states she will have her family 
drive her home upon discharge.  CM will continue to follow and assist as 
needed with discharge planning / needs.  
  
  
  
  
  
  
: Maximus Lacy
 DCPIA - Discharge Planning Initial Assessment
 
Updated by [id number]: Maximus Lacy on 19   7:05 pm
*  Is the patient Alert and Oriented?
Yes
*  How many steps to enter\exit or inside your home?
 
*  PCP
[facility]
*  Pharmacy
[facility]
 
*  Preadmission Environment
Home Alone
*  ADLs
Partial Dependent
*  Other Equipment
walker, w/c and home 02
*  List name and contact numbers for known caregivers / representatives who 
currently or will assist patient after discharge:
Gabby Jimenes - daughter - [phone], [phone]
*  Verbal permission to speak to the caregivers and representatives has been 
obtained from the patient.
Yes
*  Community resources currently utilized
Other
*  Please name any agencies selected above.
[facility]ide care - unknown provider
*  Additional services required to return to the preadmission environment?
No
*  Can the patient safely return to the preadmission environment?
Yes
*  Has this patient been hospitalized within the prior 30 days at any 
hospital?
No
 
 
 
 
 
Coverage Notice
 
Reviewer: [email or web address] Wan Lacy
 
Notice Issued Date-Time: 2019  13:30
Notice Type: Patient Choice Letter
 
Notice Delivered To: Family Member
Relationship to Patient: Daughter
Representative Name: 
 
Delivery Method: HAND - Hand Delivered
Julissa Days:
Prior Verbal Notification: 
 
Recipient Understood Notice: Yes
Recipient Signature: Yes
Med Rec Note Co-signed by Attending:
 
Coverage Notice Comment:  
 
Last DP export: 19   4:52 p
Patient Name: HYUN RODAS
 
Encounter #: [id number]
Page [id number]
 
 
 
 
 
Electronically Signed by JAKE MCKEON on 19 at 1801
 
 
 
 
 
 
**All edits/amendments must be made on the electronic document**
 
DICTATION DATE: 19 1800     : BART  19 1800     
RPT#: [id number]                                DC DATE:19
                                               STATUS: DIS IN  
[facility]
[address] [place] [address]
***END OF REPORT***